# Patient Record
Sex: MALE | HISPANIC OR LATINO | Employment: FULL TIME | URBAN - METROPOLITAN AREA
[De-identification: names, ages, dates, MRNs, and addresses within clinical notes are randomized per-mention and may not be internally consistent; named-entity substitution may affect disease eponyms.]

---

## 2023-02-23 LAB — SARS-COV-2 RESULT: NOT DETECTED

## 2024-09-28 ENCOUNTER — OFFICE VISIT (OUTPATIENT)
Dept: URGENT CARE | Age: 64
End: 2024-09-28
Payer: COMMERCIAL

## 2024-09-28 VITALS
BODY MASS INDEX: 25.11 KG/M2 | DIASTOLIC BLOOD PRESSURE: 100 MMHG | SYSTOLIC BLOOD PRESSURE: 137 MMHG | OXYGEN SATURATION: 100 % | WEIGHT: 175 LBS | HEART RATE: 66 BPM | TEMPERATURE: 97.8 F | RESPIRATION RATE: 16 BRPM

## 2024-09-28 DIAGNOSIS — R23.8 SKIN IRRITATION: Primary | ICD-10-CM

## 2024-09-28 RX ORDER — PERMETHRIN 50 MG/G
CREAM TOPICAL
Qty: 60 G | Refills: 0 | Status: SHIPPED | OUTPATIENT
Start: 2024-09-28 | End: 2024-11-07

## 2024-09-28 RX ORDER — OMEPRAZOLE 20 MG/1
20 CAPSULE, DELAYED RELEASE ORAL DAILY
COMMUNITY

## 2024-09-28 RX ORDER — LISINOPRIL 10 MG/1
1 TABLET ORAL DAILY
COMMUNITY

## 2024-09-28 RX ORDER — APIXABAN 5 MG/1
TABLET, FILM COATED ORAL
COMMUNITY
Start: 2020-10-28

## 2024-09-28 RX ORDER — METFORMIN HYDROCHLORIDE 1000 MG/1
TABLET ORAL
COMMUNITY

## 2024-09-28 RX ORDER — HYDROXYZINE HYDROCHLORIDE 25 MG/1
50 TABLET, FILM COATED ORAL NIGHTLY PRN
Qty: 20 TABLET | Refills: 0 | Status: SHIPPED | OUTPATIENT
Start: 2024-09-28 | End: 2024-10-03

## 2024-09-28 RX ORDER — SEMAGLUTIDE 0.68 MG/ML
0.25 INJECTION, SOLUTION SUBCUTANEOUS
COMMUNITY

## 2024-09-28 ASSESSMENT — PAIN SCALES - GENERAL: PAINLEVEL: 0-NO PAIN

## 2024-09-28 ASSESSMENT — PATIENT HEALTH QUESTIONNAIRE - PHQ9
1. LITTLE INTEREST OR PLEASURE IN DOING THINGS: NOT AT ALL
2. FEELING DOWN, DEPRESSED OR HOPELESS: NOT AT ALL
SUM OF ALL RESPONSES TO PHQ9 QUESTIONS 1 AND 2: 0

## 2024-09-28 NOTE — PROGRESS NOTES
Subjective   Patient ID: Yony Anderson is a 64 y.o. male. They present today with a chief complaint of Itching (X1 month).    Patient disposition: Home    History of Present Illness  HPI  Skin itchiness for the past 1 month.  Denies any pain.  Occasionally has lesions that come and go.  Recently had 50 pound weight loss over the past 1 year with Ozempic.  Diabetic.  Dry skin this time of the year.  Lesion started on his posterior neck 1 month ago, improved and now on left arm antecubital area, right forearm, bilateral legs.  Itchy.  Using Benadryl to help sleep.  Wife with no symptoms.  Initially thought it was bedbugs, had  evaluate the bedroom and furniture and there were no findings of insects.  Past Medical History  Allergies as of 09/28/2024 - Reviewed 09/28/2024   Allergen Reaction Noted    Prednisone Cardiac arrhythmia/arrest and Unknown 02/25/2011       (Not in a hospital admission)                Review of Systems  As noted in HPI. ROS otherwise negative unless noted.       Objective    Vitals:    09/28/24 1556   BP: (!) 137/100   BP Location: Left arm   Patient Position: Sitting   BP Cuff Size: Adult   Pulse: 66   Resp: 16   Temp: 36.6 °C (97.8 °F)   TempSrc: Oral   SpO2: 100%   Weight: 79.4 kg (175 lb)     No LMP for male patient.    Physical Exam  Constitutional: vital signs reviewed. Well developed, well nourished. patient alert and patient without distress.   Psych: Normal mood and affect  Skin: Normal skin color and pigmentation, normal skin turgor, right forearm with single unroofed lesion, left antecubital area with small area of contact dermatitis and single similar lesion.  3 lesions in bilateral legs.  Posterior neck has cleared.  No clustering of lesions.  Normal hands.  No furrow.  Lymphatic: No extremity edema  Cardiovascular: No edema in the extremities. Normal skin color/perfusion.   Pulmonary: Skin without cyanosis. Patient without respiratory distress. Speaking in full  sentences.  Musculoskeletal: Normal gait and stance, balance and coordination without gross deficit.        Procedures    Point of Care Test & Imaging Results from this visit         Diagnostic study results (if any) were reviewed.    Assessment/Plan   Allergies, medications, history, and pertinent labs/EKGs/Imaging reviewed.    Medical Decision Making  See note    Orders and Diagnoses  There are no diagnoses linked to this encounter.    Medical Admin Record      Follow Up Instructions  No follow-ups on file.        Electronically signed by [facility]

## 2024-09-28 NOTE — PATIENT INSTRUCTIONS
Use a cool damp cloth on your skin to reduce the itchiness.  Take cold showers to avoid itchy skin.  Avoid itching or scratching your skin as this can cause infection.  Keep your skin moisturized.  Medications that can be placed to reduce the itching, Benadryl cream, calamine lotion.    Use the medicated antihistamine as directed.    Use a medicated cream as directed